# Patient Record
Sex: MALE | Race: WHITE | HISPANIC OR LATINO
[De-identification: names, ages, dates, MRNs, and addresses within clinical notes are randomized per-mention and may not be internally consistent; named-entity substitution may affect disease eponyms.]

---
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
## 2018-02-10 ENCOUNTER — EMERGENCY (EMERGENCY)
Facility: HOSPITAL | Age: 18
LOS: 0 days | Discharge: HOME | End: 2018-02-10
Attending: STUDENT IN AN ORGANIZED HEALTH CARE EDUCATION/TRAINING PROGRAM

## 2018-02-10 VITALS
WEIGHT: 285.5 LBS | OXYGEN SATURATION: 100 % | SYSTOLIC BLOOD PRESSURE: 143 MMHG | RESPIRATION RATE: 20 BRPM | HEART RATE: 84 BPM | DIASTOLIC BLOOD PRESSURE: 83 MMHG | TEMPERATURE: 98 F

## 2018-02-10 DIAGNOSIS — R07.89 OTHER CHEST PAIN: ICD-10-CM

## 2018-02-10 DIAGNOSIS — R05 COUGH: ICD-10-CM

## 2018-02-10 DIAGNOSIS — R06.02 SHORTNESS OF BREATH: ICD-10-CM

## 2018-02-10 DIAGNOSIS — R00.2 PALPITATIONS: ICD-10-CM

## 2018-02-10 DIAGNOSIS — F17.200 NICOTINE DEPENDENCE, UNSPECIFIED, UNCOMPLICATED: ICD-10-CM

## 2018-02-10 DIAGNOSIS — R42 DIZZINESS AND GIDDINESS: ICD-10-CM

## 2018-02-10 RX ORDER — ACETAMINOPHEN 500 MG
975 TABLET ORAL ONCE
Qty: 0 | Refills: 0 | Status: COMPLETED | OUTPATIENT
Start: 2018-02-10 | End: 2018-02-10

## 2018-02-10 RX ORDER — IBUPROFEN 200 MG
600 TABLET ORAL ONCE
Qty: 0 | Refills: 0 | Status: COMPLETED | OUTPATIENT
Start: 2018-02-10 | End: 2018-02-10

## 2018-02-10 RX ADMIN — Medication 975 MILLIGRAM(S): at 09:55

## 2018-02-10 RX ADMIN — Medication 600 MILLIGRAM(S): at 09:55

## 2018-02-10 NOTE — ED PROVIDER NOTE - NS ED ROS FT
Constitutional: See HPI.  Pt eating and drinking normally and having normal urine and BM output.  Eyes: No discharge, erythema, pain, vision changes.  ENMT: No URI symptoms. No neck pain or stiffness.  Cardiac: No hx of known congenital defects. + CP. No SOB  Respiratory: No cough, stridor, or respiratory distress.   GI: No nausea, vomiting, diarrhea or pain  : Normal frequency. No foul smelling urine. No dysuria.   MS: No muscle weakness, myalgia, joint pain, back pain  Neuro: No headache or weakness. No LOC.  Skin: No skin rash.

## 2018-02-10 NOTE — ED PROVIDER NOTE - ATTENDING CONTRIBUTION TO CARE
18 y/o M no sig PMH, FHX, p/w chest pain x 1d.  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  pain is "pain" L sided.  + lightheaded, resolved.  non radiating, no n/v sob, cough, fever, leg swelling, syncope.  No prior similar sx. no recent travel. No hx dvt/ pe/ mi.  NO cardiac wup.  Pt has Lsided cp, somewhat worse w/ movement on exam, o/w unremakable.  IMP: cp, unclear etiology; Pt is very low cardiac risk. P: ekg, cxr, FS, reassess.

## 2018-02-10 NOTE — ED PROVIDER NOTE - OBJECTIVE STATEMENT
16 y/o M no pmh p/w 1 day of palpitations, non radiating mid chest pain, SOB and mild dry cough. Denies fever/chills, recent illness, n/v/d.

## 2018-02-10 NOTE — ED PROVIDER NOTE - PHYSICAL EXAMINATION
AOx4, Non toxic appearing, NAD. Head normocephalic, atraumatic. Skin  warm and dry, no acute rash. Heart RRR s1s2 nl, no rub/murmur. Lungs- No retractions, BS equal, CTAB. Abdomen soft ntnd no r/g. Extremities- moves all normally.

## 2018-05-13 ENCOUNTER — TRANSCRIPTION ENCOUNTER (OUTPATIENT)
Age: 18
End: 2018-05-13

## 2019-08-06 ENCOUNTER — TRANSCRIPTION ENCOUNTER (OUTPATIENT)
Age: 19
End: 2019-08-06

## 2019-08-08 ENCOUNTER — TRANSCRIPTION ENCOUNTER (OUTPATIENT)
Age: 19
End: 2019-08-08

## 2019-08-09 ENCOUNTER — TRANSCRIPTION ENCOUNTER (OUTPATIENT)
Age: 19
End: 2019-08-09

## 2019-08-09 ENCOUNTER — INPATIENT (INPATIENT)
Facility: HOSPITAL | Age: 19
LOS: 2 days | Discharge: HOME | End: 2019-08-12
Attending: PEDIATRICS | Admitting: PEDIATRICS
Payer: COMMERCIAL

## 2019-08-09 VITALS
SYSTOLIC BLOOD PRESSURE: 142 MMHG | OXYGEN SATURATION: 98 % | DIASTOLIC BLOOD PRESSURE: 91 MMHG | HEART RATE: 115 BPM | TEMPERATURE: 209 F | RESPIRATION RATE: 20 BRPM

## 2019-08-09 DIAGNOSIS — L03.90 CELLULITIS, UNSPECIFIED: ICD-10-CM

## 2019-08-09 DIAGNOSIS — Z90.49 ACQUIRED ABSENCE OF OTHER SPECIFIED PARTS OF DIGESTIVE TRACT: Chronic | ICD-10-CM

## 2019-08-09 LAB
HCT VFR BLD CALC: 40.6 % — LOW (ref 42–52)
HGB BLD-MCNC: 12.8 G/DL — LOW (ref 14–18)
MCHC RBC-ENTMCNC: 26 PG — LOW (ref 27–31)
MCHC RBC-ENTMCNC: 31.5 G/DL — LOW (ref 32–37)
MCV RBC AUTO: 82.4 FL — SIGNIFICANT CHANGE UP (ref 80–94)
NRBC # BLD: 0 /100 WBCS — SIGNIFICANT CHANGE UP (ref 0–0)
PLATELET # BLD AUTO: 286 K/UL — SIGNIFICANT CHANGE UP (ref 130–400)
RBC # BLD: 4.93 M/UL — SIGNIFICANT CHANGE UP (ref 4.7–6.1)
RBC # FLD: 13.7 % — SIGNIFICANT CHANGE UP (ref 11.5–14.5)
WBC # BLD: 11.26 K/UL — HIGH (ref 4.8–10.8)
WBC # FLD AUTO: 11.26 K/UL — HIGH (ref 4.8–10.8)

## 2019-08-09 PROCEDURE — 73140 X-RAY EXAM OF FINGER(S): CPT | Mod: 26,LT

## 2019-08-09 PROCEDURE — 99285 EMERGENCY DEPT VISIT HI MDM: CPT

## 2019-08-09 RX ORDER — SODIUM CHLORIDE 9 MG/ML
1000 INJECTION, SOLUTION INTRAVENOUS
Refills: 0 | Status: DISCONTINUED | OUTPATIENT
Start: 2019-08-09 | End: 2019-08-11

## 2019-08-09 RX ORDER — IBUPROFEN 200 MG
400 TABLET ORAL EVERY 6 HOURS
Refills: 0 | Status: DISCONTINUED | OUTPATIENT
Start: 2019-08-09 | End: 2019-08-12

## 2019-08-09 RX ORDER — ACETAMINOPHEN 500 MG
650 TABLET ORAL EVERY 6 HOURS
Refills: 0 | Status: DISCONTINUED | OUTPATIENT
Start: 2019-08-09 | End: 2019-08-12

## 2019-08-09 RX ADMIN — Medication 100 MILLIGRAM(S): at 02:27

## 2019-08-09 RX ADMIN — Medication 400 MILLIGRAM(S): at 07:52

## 2019-08-09 RX ADMIN — Medication 100 MILLIGRAM(S): at 10:24

## 2019-08-09 RX ADMIN — Medication 400 MILLIGRAM(S): at 17:55

## 2019-08-09 RX ADMIN — Medication 100 MILLIGRAM(S): at 18:00

## 2019-08-09 NOTE — DISCHARGE NOTE PROVIDER - HOSPITAL COURSE
Kaden is a 19 yo male with no past medical history who presented to the ED s/p a burn 2 weeks ago at work with worsening erythema of the affected left 2nd digit. 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 He states he was initially getting better, until he work up one day flailing a bit and smacked his  hand into the wall approximately 2 days ago. Since that day his finger has been swollen and it began to turn more red. He tried icing the area with minimal reduction in swelling and states it would return within a couple of hours of removing ice. He presented to  and was given oral antibiotics Augmentin/Bactrim which he took <24hours, and then presented to the ED because he states his finger started to look a bit more red and swollen. He was admitted for IV antibiotics.         Pmhx: none    Pshx: appendectomy     allergies: NKDA    UTD on vaccinations     PMD: Weston     Family history of diabetes         In ED: CBC, Blood cx, Xray, x1 Clindamycin         On floor: Clindamycin q8hrs IV. Kaden is a 18 year old male with no past medical history who presented with a 2 day history of erythema, edema and increased tenderness at the second left digit in the context of a recent work place burn from a hot skillet two weeks ago, admitted for management of cellulitis.         Pmhx: none    Pshx: appendectomy     allergies: NKDA    UTD on vaccinations     PMD: Weston     Family history of diabetes         In ED: CBC, Blood cx, Xray, x1 Clindamycin         On floor: Clindamycin q8hrs IV. Kaden is a 18 year old male with no past medical history who presented with a 2 day history of erythema, edema and increased tenderness at the second left digit in the context of a recent work place burn from a hot skillet two weeks ago, admitted for management of cellulitis.         In ED:         CBC, Blood cx, Xray, x1 Clindamycin         Floor course:        Resp: Remained stable on RA throughout course        FENGI: Tolerated regular diet        ID: Clindamycin q8hrs IV. Remained afebrile throughout stay and had improvement in erythema and edema of the affected digit. Blood culture showed ___. XR showed __.         Labs:        Complete Blood Count (08.09.19 @ 01:39)      Nucleated RBC: 0 /100 WBCs      WBC Count: 11.26 K/uL      RBC Count: 4.93 M/uL      Hemoglobin: 12.8 g/dL      Hematocrit: 40.6 %      Mean Cell Volume: 82.4 fL      Mean Cell Hemoglobin: 26.0 pg      Mean Cell Hemoglobin Conc: 31.5 g/dL      Red Cell Distrib Width: 13.7 %      Platelet Count - Automated: 286 K/uL Kaden is a 18 year old male with no past medical history who presented with a 2 day history of erythema, edema and increased tenderness at the second left digit in the context of a recent work place burn from a hot skillet two weeks ago, admitted for management of cellulitis.         In ED:         CBC, Blood cx, Xray, x1 Clindamycin         Floor course:        Resp: Remained stable on RA throughout course        FENGI: Tolerated regular diet        ID: Clindamycin q8hrs IV. Remained afebrile throughout stay and had mild improvement in erythema and edema of the affected digit. Blood culture showed ___. XR showed __. He was discharged home on __ day course of Clindamycin __mg PO __.        Labs:        Complete Blood Count (08.09.19 @ 01:39)      Nucleated RBC: 0 /100 WBCs      WBC Count: 11.26 K/uL      RBC Count: 4.93 M/uL      Hemoglobin: 12.8 g/dL      Hematocrit: 40.6 %      Mean Cell Volume: 82.4 fL      Mean Cell Hemoglobin: 26.0 pg      Mean Cell Hemoglobin Conc: 31.5 g/dL      Red Cell Distrib Width: 13.7 %      Platelet Count - Automated: 286 K/uL Kaden is a 18 year old male with no past medical history who presented with a 2 day history of erythema, edema and increased tenderness at the second left digit in the context of a recent work place burn from a hot skillet two weeks ago, admitted for management of cellulitis.         In ED:         CBC, Blood cx, Xray, x1 Clindamycin         Floor course:        Resp: Remained stable on RA throughout course        FENGI: Tolerated regular diet        ID: Clindamycin q8hrs IV. Remained afebrile throughout stay and had mild improvement in erythema and edema of the affected digit. Blood culture showed ___. XR negative for osteomyelitis. He was discharged home on __ day course of Clindamycin __mg PO q8h.        Labs:        Complete Blood Count (08.09.19 @ 01:39)      Nucleated RBC: 0 /100 WBCs      WBC Count: 11.26 K/uL      RBC Count: 4.93 M/uL      Hemoglobin: 12.8 g/dL      Hematocrit: 40.6 %      Mean Cell Volume: 82.4 fL      Mean Cell Hemoglobin: 26.0 pg      Mean Cell Hemoglobin Conc: 31.5 g/dL      Red Cell Distrib Width: 13.7 %      Platelet Count - Automated: 286 K/uL        Imaging:        < from: Xray Finger, Left Hand (08.09.19 @ 02:55) >        Impression:    Second digit soft tissue swelling without further evidence of     osteomyelitis.         < end of copied text > Kaden is a 18 year old male with no past medical history who presented with a 2 day history of erythema, edema and increased tenderness at the second left digit in the context of a recent work place burn from a hot skillet two weeks ago, admitted for management of cellulitis.         In ED:         CBC, Blood cx, Xray, x1 Clindamycin         Floor course:        Resp: Remained stable on RA throughout course        FENGI: Tolerated regular diet        ID: Clindamycin q8hrs IV. Remained afebrile throughout stay and had mild improvement in erythema and edema of the affected digit. Blood culture showed no growth to date.     XR negative for osteomyelitis. He was discharged home on __ day course of Clindamycin __mg PO q8h.        Labs:        Complete Blood Count (08.09.19 @ 01:39)      Nucleated RBC: 0 /100 WBCs      WBC Count: 11.26 K/uL      RBC Count: 4.93 M/uL      Hemoglobin: 12.8 g/dL      Hematocrit: 40.6 %      Mean Cell Volume: 82.4 fL      Mean Cell Hemoglobin: 26.0 pg      Mean Cell Hemoglobin Conc: 31.5 g/dL      Red Cell Distrib Width: 13.7 %      Platelet Count - Automated: 286 K/uL        Imaging:        < from: Xray Finger, Left Hand (08.09.19 @ 02:55) >        Impression:    Second digit soft tissue swelling without further evidence of     osteomyelitis.         < end of copied text > Kaden is a 18 year old male with no past medical history who presented with a 2 day history of erythema, edema and increased tenderness at the second left digit in the context of a recent work place burn from a hot skillet two weeks ago, admitted for management of cellulitis.         In ED:         CBC, Blood cx, Xray, x1 Clindamycin         Floor course:        Resp: Remained stable on RA throughout course        FENGI: Tolerated regular diet        ID: Started on Clindamycin, transitioned to vancomycin 1650mg q12h on day 2 after no improvement. Surgery performed I&D which he tolerated well. Remained afebrile throughout stay and had improvement in erythema and edema of the affected digit. Blood culture showed no growth to date. Wound culture positive for staph. aureus, sensitive to ___.     XR negative for osteomyelitis. He was discharged home on __ day course of ___.         Labs:        Culture - Blood (08.09.19 @ 01:39)      Specimen Source: .Blood Blood-Peripheral      Culture Results:     No growth to date.        Culture - Abscess with Gram Stain (08.10.19 @ 13:10)      Specimen Source: .Abscess left finger 2nd digit      Culture Results:     Moderate Staphylococcus aureus        Complete Blood Count (08.09.19 @ 01:39)      Nucleated RBC: 0 /100 WBCs      WBC Count: 11.26 K/uL      RBC Count: 4.93 M/uL      Hemoglobin: 12.8 g/dL      Hematocrit: 40.6 %      Mean Cell Volume: 82.4 fL      Mean Cell Hemoglobin: 26.0 pg      Mean Cell Hemoglobin Conc: 31.5 g/dL      Red Cell Distrib Width: 13.7 %      Platelet Count - Automated: 286 K/uL        Imaging:        < from: Xray Finger, Left Hand (08.09.19 @ 02:55) >        Impression:    Second digit soft tissue swelling without further evidence of     osteomyelitis.         < end of copied text > Kaden is a 18 year old male with no past medical history who presented with a 2 day history of erythema, edema and increased tenderness at the second left digit in the context of a recent work place burn from a hot skillet two weeks ago, admitted for management of cellulitis.         In ED:         CBC, Blood cx, Xray, x1 Clindamycin         Floor course:        Resp: Remained stable on RA throughout course        FENGI: Tolerated regular diet        ID: Started on Clindamycin, transitioned to vancomycin 1650mg q12h on day 2 after no improvement. Surgery performed I&D which he tolerated well. Remained afebrile throughout stay and had improvement in erythema and edema of the affected digit. Blood culture showed no growth to date. Wound culture positive for staph. aureus, sensitive to bactrim, clindamycin and vancomycin. Patient to be sent home on bactrim for 7 more days.    XR negative for osteomyelitis. He was discharged home on 8/12/19. He will follow up with his PMD.         Labs:        Culture - Blood (08.09.19 @ 01:39)      Specimen Source: .Blood Blood-Peripheral      Culture Results:     No growth to date.        Culture - Abscess with Gram Stain (08.10.19 @ 13:10)      Specimen Source: .Abscess left finger 2nd digit      Culture Results:     Moderate Staphylococcus aureus        Complete Blood Count (08.09.19 @ 01:39)      Nucleated RBC: 0 /100 WBCs      WBC Count: 11.26 K/uL      RBC Count: 4.93 M/uL      Hemoglobin: 12.8 g/dL      Hematocrit: 40.6 %      Mean Cell Volume: 82.4 fL      Mean Cell Hemoglobin: 26.0 pg      Mean Cell Hemoglobin Conc: 31.5 g/dL      Red Cell Distrib Width: 13.7 %      Platelet Count - Automated: 286 K/uL        Imaging:        < from: Xray Finger, Left Hand (08.09.19 @ 02:55) >        Impression:    Second digit soft tissue swelling without further evidence of     osteomyelitis.         < end of copied text >

## 2019-08-09 NOTE — H&P PEDIATRIC - PROBLEM SELECTOR PLAN 1
IV Clindamycin q8hrs. Cx if drains.     Resp:  ARASH ELII:  Regular diet  D5NS    Pain/Fever:  Acetaminophen/Ibuprofen

## 2019-08-09 NOTE — ED PROVIDER NOTE - NS ED ROS FT
Review of Systems:  	•	CONSTITUTIONAL - no fever, no diaphoresis  	•	SKIN - +redness and swelling to LT hand; no lesions  	•	HEMATOLOGIC - no bleeding, no bruising  	•	EYES - no discharge, no injection  	•	ENT - no otorrhea, no rhinorrhea  	•	RESPIRATORY - no shortness of breath, no cough  	•	CARDIAC - no chest pain, no palpitations  	•	GI - no nausea, no vomiting, no diarrhea  	•	MUSCULOSKELETAL - no joint paint, no swelling, no redness  	•	NEUROLOGIC - no weakness, no headache, no anesthesia, no paresthesias

## 2019-08-09 NOTE — DISCHARGE NOTE PROVIDER - PROVIDER TOKENS
FREE:[LAST:[Dione],FIRST:[Brandon],PHONE:[phone]],FAX:[phone]   [phone]    -],ADDRESS:[[address] [place] [address]],FOLLOWUP:[1-3 days]]

## 2019-08-09 NOTE — DISCHARGE NOTE PROVIDER - NSDCCPCAREPLAN_GEN_ALL_CORE_FT
PRINCIPAL DISCHARGE DIAGNOSIS  Diagnosis: Cellulitis  Assessment and Plan of Treatment: -blood cx showed   -xray showed  -received clindamycin 900 mg q8h, sent home on PO clindamycin __mg for __ days. PRINCIPAL DISCHARGE DIAGNOSIS  Diagnosis: Cellulitis  Assessment and Plan of Treatment: -blood cx showed no growth  -wound culture showed moderate staph aureus  -xray showed no evidence of osteomyelitis  -I&D done by surgery, recommend daily dressing changes and wound packing  -received clindamycin 900 mg q8h, switched to vancomycin 1650mg q12h and sent home on ___.

## 2019-08-09 NOTE — ED PROVIDER NOTE - PHYSICAL EXAMINATION
GEN: alert, NAD  HEAD:  normocephalic, atraumatic  EYES:  conjunctivae without injection, drainage or discharge  ENMT:  nasal mucosa moist; mouth moist without ulcerations or lesions; throat moist without erythema, exudate, ulcerations or lesions  NECK:  supple, no masses  CARDIAC:  regular rate and rhythm, normal S1 and S2, no murmurs, rubs or gallops  RESP:  respiratory rate and effort appear normal for age; lungs are clear to auscultation bilaterally; no rales or wheezes  ABDOMEN:  soft, nontender, nondistended, no masses, no organomegaly  MUSCULOSKELETAL/NEURO:  normal movement, normal tone; ROM of LT 2nd finger decreased due to pain, sensation intact cap refill <2sec  SKIN:  LT 2nd finger w/ erythema and edema over PIP, with spread to proximal hand and wrist; well-perfused; warm and dry

## 2019-08-09 NOTE — ED PEDIATRIC NURSE NOTE - CHPI ED NUR SYMPTOMS NEG
no purulent drainage/no redness/no vomiting/no bleeding at site/no blood in mucus/no drainage/no rectal pain/no chills/no fever

## 2019-08-09 NOTE — ED PROVIDER NOTE - ATTENDING CONTRIBUTION TO CARE
15yr male burned his second left digit 2 weeks ago and 2 days ago noticed area to be painful swollen and erythematous went to urgent care and ortho today got bactrim and augmentin but area is getting more red and swollen no fever no chills  VS reviewed, stable.  Gen: interactive, well appearing, no acute distress  HEENT: NC/AT,  right TM  non bulging  left tm,  no evidence of mastoiditis,  moist mucus membranes, pupils equal, responsive, reactive to light and accomodation, no conjunctivitis or scleral icterus; no nasal discharge .   OP no exudates no erythema  Neck: FROM, supple, no cervical LAD  Chest: CTA b/l, no crackles/wheezes, good air entry, no tachypnea or retractions  CV: regular rate and rhythm, no murmurs   Abd: soft, nontender, nondistended, no HSM appreciated, +BS  left second digit healing burn with surrounding erythema warmth and swelling 4cm x4cm and erythema is extending to the volar aspect of hand and wrist  plan will start patient on clindmaycin obtain cbc and hand xray

## 2019-08-09 NOTE — H&P PEDIATRIC - NSHPPHYSICALEXAM_GEN_ALL_CORE
General: Well appearing, well developed Male, appropriately interactive, no acute distress    HEENT: NC/AT, Conjunctiva clear and not injected, sclera non-icteric, Nares patent, Mucous membranes moist, Pharynx nonerythematous, neck supple, no cervical lymphadenopathy   Heart: RRR, S1, S2, no rubs, murmurs, or gallops   Lung: CTAB, no wheezing, rhonchi, or crackles, no retractions, no nasal faring   Abdomen: +BS, soft, nontender, nondistended   Neuro: No nystagmus, EOMI, motor grossly intake  Skin: L 2nd digit erythema and edema, Good turgor, no rash, no bruising or prominent lesions

## 2019-08-09 NOTE — CHART NOTE - NSCHARTNOTEFT_GEN_A_CORE
Kaden is a 18 year old male with no past medical history presenting with a 2 day history of erythema, edema and increased tenderness at the second left digit in the context of a recent work place burn from a hot skillet two weeks ago, admitted for management of cellulitis.    As per patient, he burnt his finger two weeks ago while at work. 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 He was following with the burn team who and managing the wound care appropriately. Two days ago, he hit his finger and noted immediate pain. Over the course of the following two days, erythema and pain ensued. He went to urgent care and ortho who recommended PO abx bactrim and augmentin, he took one dose but thought the area looked worse so came to the ED for evaluation.    ROS +: tenderness, erythema, edema  ROS -: No fever, no vomiting, no diarrhea, no decreased PO, no cough, no worsening rash    PMhx; none  PSx: appendectomy at 6  Meds: none  UTD vaccines  NKA  Family hx: diabetes  Social: lives at home with parents and two cats, one bird, works as a cook, sexually active with women, uses protection, + marijuana use  Birth hx: non contributory  Developmentally: Appropriate    Vital Signs (24 Hrs):  T(C): 36.3 (08-09-19 @ 03:50), Max: 98.3 (08-09-19 @ 00:20)  HR: 70 (08-09-19 @ 03:50) (70 - 115)  BP: 127/72 (08-09-19 @ 03:50) (127/72 - 142/91)  RR: 18 (08-09-19 @ 03:50) (18 - 20)  SpO2: 96% (08-09-19 @ 03:50) (96% - 98%)  Daily Height/Length in cm: 178 (09 Aug 2019 03:50)      PHYSICAL EXAM:  Gen: Patient is well appearing, multiple tattoos over arms  HEENT: NCAT, PERRLA, no conjunctivitis or scleral icterus; no rhinorhea or congestion. OP without exudates/erythema.   Neck: FROM, supple, no cervical LAD  Resp: CTABL, no crackles/wheezes, good air entry, no tachypnea or retractions  CV: RRR, normal S1/S2, no murmurs   Abd: Soft, NT/ND, no HSM appreciated, +BS  Extremities: left second digit erythema and edema over the phalynx, skin is shiny and tender on palpation, no pus draining at this time. Area demarcating region noted.    In the ed, cbc, bcx, xray, clindamycin given, patient admitted.                        12.8   11.26 )-----------( 286      ( 09 Aug 2019 01:39 )             40.6   Bcx: pending    Xray:     Assessment:   18 year old male who is admitted for management of cellulitis, location of the site is the finger, so would warrant from IV abx at this time, however patient did not fail outpatient management as he had only taken one dose earlier on the day of admission. He is well appearing with no systemic signs or symptoms, anticipate transitioning to PO in the near future.    Plan    Resp  -RA    ALCIRAI  -D5NS @ 1/2 M 50 cc/h  -Regular diet    ID  -Clindamycin 900 mg IV q8h  -Wet warm compresses q3h   -Culture if site drains  -F/U Bcx  -F/U xray read

## 2019-08-09 NOTE — H&P PEDIATRIC - HISTORY OF PRESENT ILLNESS
Kaden is a 19 yo male with no past medical history who presented to the ED s/p a burn 1 week ago at work with worsening erythema of the affected left 2nd digit. 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 He states he was initially getting better, until he work up one day flailing a bit and smacked his  hand into the wall. Since that day his finger has been swollen and it began to turn more red. He tried icing the area with minimal reduction in swelling and states it would return within a couple of ours of removing ice. He presented to  and was given oral antibiotics Augmentin/Bactrim which he took <24hours, and then presented to the ED because he states his finger started to look a bit more red and swollen. He is being admitted for IV antibiotics.     Pmhx: none  Pshx: appendectomy   allergies: NKDA  UTD on vaccinations   PMD: Weston   Family history of diabetes     In ED: CBC, Blood cx, Xray, x1 Clindamycin Kaden is a 17 yo male with no past medical history who presented to the ED s/p a burn 2 weeks ago at work with worsening erythema of the affected left 2nd digit. 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 He states he was initially getting better, until he work up one day flailing a bit and smacked his  hand into the wall approximately 2 days ago. Since that day his finger has been swollen and it began to turn more red. He tried icing the area with minimal reduction in swelling and states it would return within a couple of ours of removing ice. He presented to  and was given oral antibiotics Augmentin/Bactrim which he took <24hours, and then presented to the ED because he states his finger started to look a bit more red and swollen. He is being admitted for IV antibiotics.     Pmhx: none  Pshx: appendectomy   allergies: NKDA  UTD on vaccinations   PMD: Weston   Family history of diabetes     In ED: CBC, Blood cx, Xray, x1 Clindamycin

## 2019-08-09 NOTE — H&P PEDIATRIC - NSHPLABSRESULTS_GEN_ALL_CORE
Complete Blood Count (08.09.19 @ 01:39)    Nucleated RBC: 0 /100 WBCs    WBC Count: 11.26 K/uL    RBC Count: 4.93 M/uL    Hemoglobin: 12.8 g/dL    Hematocrit: 40.6 %    Mean Cell Volume: 82.4 fL    Mean Cell Hemoglobin: 26.0 pg    Mean Cell Hemoglobin Conc: 31.5 g/dL    Red Cell Distrib Width: 13.7 %    Platelet Count - Automated: 286 K/uL

## 2019-08-09 NOTE — ED PROVIDER NOTE - OBJECTIVE STATEMENT
18M no pmhx UTD vax c/o LT 2nd digit pain x2 days, worsening; states that he burnt same area on a hot skillet 3-4wks ago, was healing fine until 2 days ago he hit finger on a stucco wall; states finger has become more swollen and erthema is spreading proximally, went to urgent care who sent him to ortho, got xray which was negative, came to ED because sx worsening and worrisome. Took 1 round of bactrim + augmentin rx'ed by [facility], ortho rx'ed keflex but he has not started.

## 2019-08-09 NOTE — H&P PEDIATRIC - NSHPSOCIALHISTORY_GEN_ALL_CORE
Lives at home with mom and dad. Sexually active with women, uses protection. Works as a cook. Occasional marijuana use.

## 2019-08-09 NOTE — H&P PEDIATRIC - ASSESSMENT
Kaden is a 19yo male with no past medical history who presented to the ED s/p a burn 1 week ago, <24hours of oral abx, with erythema and edema of the L 2nd digit, admitted for cellulitis of the left 2nd digit and IV antibiotics. On physical exam his Left 2nd digit appears erythematous and edematous with tense overlaying skin. He is able to move his joint, but endorses mild pain. Kaden is a 19yo male with no past medical history who presented to the ED s/p a burn 2 weeks ago, worsening erythema and edema of the L 2nd digit x2 days, <24hours of oral abx, admitted for cellulitis of the left 2nd digit and IV antibiotics. On physical exam his Left 2nd digit appears erythematous and edematous with tense overlaying skin. He is able to move his joint, but endorses mild pain.

## 2019-08-09 NOTE — DISCHARGE NOTE PROVIDER - CARE PROVIDER_API CALL
Brandon Parham  [address] [place] [address]  Phone: [phone]  Fax: (   )    -  Follow Up Time: 1-3 days

## 2019-08-10 PROCEDURE — 99232 SBSQ HOSP IP/OBS MODERATE 35: CPT

## 2019-08-10 RX ORDER — VANCOMYCIN HCL 1 G
1650 VIAL (EA) INTRAVENOUS EVERY 12 HOURS
Refills: 0 | Status: DISCONTINUED | OUTPATIENT
Start: 2019-08-11 | End: 2019-08-12

## 2019-08-10 RX ORDER — VANCOMYCIN HCL 1 G
1650 VIAL (EA) INTRAVENOUS ONCE
Refills: 0 | Status: DISCONTINUED | OUTPATIENT
Start: 2019-08-10 | End: 2019-08-10

## 2019-08-10 RX ORDER — VANCOMYCIN HCL 1 G
1750 VIAL (EA) INTRAVENOUS EVERY 12 HOURS
Refills: 0 | Status: DISCONTINUED | OUTPATIENT
Start: 2019-08-10 | End: 2019-08-10

## 2019-08-10 RX ORDER — VANCOMYCIN HCL 1 G
1750 VIAL (EA) INTRAVENOUS EVERY 8 HOURS
Refills: 0 | Status: DISCONTINUED | OUTPATIENT
Start: 2019-08-10 | End: 2019-08-10

## 2019-08-10 RX ORDER — VANCOMYCIN HCL 1 G
1650 VIAL (EA) INTRAVENOUS ONCE
Refills: 0 | Status: COMPLETED | OUTPATIENT
Start: 2019-08-10 | End: 2019-08-10

## 2019-08-10 RX ORDER — VANCOMYCIN HCL 1 G
VIAL (EA) INTRAVENOUS
Refills: 0 | Status: DISCONTINUED | OUTPATIENT
Start: 2019-08-10 | End: 2019-08-10

## 2019-08-10 RX ADMIN — Medication 250 MILLIGRAM(S): at 19:21

## 2019-08-10 RX ADMIN — Medication 650 MILLIGRAM(S): at 21:15

## 2019-08-10 RX ADMIN — Medication 100 MILLIGRAM(S): at 10:02

## 2019-08-10 RX ADMIN — Medication 400 MILLIGRAM(S): at 16:15

## 2019-08-10 RX ADMIN — SODIUM CHLORIDE 50 MILLILITER(S): 9 INJECTION, SOLUTION INTRAVENOUS at 12:33

## 2019-08-10 RX ADMIN — Medication 400 MILLIGRAM(S): at 23:38

## 2019-08-10 RX ADMIN — Medication 650 MILLIGRAM(S): at 20:18

## 2019-08-10 RX ADMIN — Medication 400 MILLIGRAM(S): at 16:45

## 2019-08-10 RX ADMIN — Medication 100 MILLIGRAM(S): at 02:11

## 2019-08-10 RX ADMIN — Medication 400 MILLIGRAM(S): at 08:59
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
## 2019-08-10 NOTE — CONSULT NOTE ADULT - ASSESSMENT
Left 2nd digit abscess  - I&D at bedside (see procedure note)  - follow up wound culture  - Continue IV antibiotics  - Change wound packing in morning  - keep hand elevated
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
## 2019-08-10 NOTE — PROGRESS NOTE PEDS - SUBJECTIVE AND OBJECTIVE BOX
Internal/Overnight Events: Patient is a 18y old  Male who presents with a chief complaint of Cellulitis (09 Aug 2019 04:30)  Pt states finger not getting better, had some drainage this morning (which was cultured).  remains afebrile, good appetite, otherwise ok,        MEDICATIONS  (STANDING):  dextrose 5% + sodium chloride 0.9%. - Pediatric 1000 milliLiter(s) (50 mL/Hr) IV Continuous <Continuous>    MEDICATIONS  (PRN):  acetaminophen   Oral Tab/Cap - Peds. 650 milliGRAM(s) Oral every 6 hours PRN Temp greater or equal to 38 C (100.4 F), Mild Pain (1 - 3)  ibuprofen  Oral Tab/Cap - Peds. 400 milliGRAM(s) Oral every 6 hours PRN Temp greater or equal to 38.5C (101.3 F), Moderate Pain (4 - 6)    Allergies    No Known Allergies    Intolerances      Diet:    There are no updates to the medical, surgical, social or family history unless described:    Review of Systems: Negative except for noted above     Vital Signs Last 24 Hrs  T(C): 35.8 (10 Aug 2019 07:15), Max: 36.9 (09 Aug 2019 23:15)  T(F): 96.4 (10 Aug 2019 07:15), Max: 98.4 (09 Aug 2019 23:15)  HR: 68 (10 Aug 2019 07:15) (62 - 76)  BP: 119/78 (10 Aug 2019 07:15) (111/71 - 140/80)  BP(mean): --  RR: 20 (10 Aug 2019 07:15) (18 - 20)  SpO2: 100% (10 Aug 2019 07:15) (97% - 100%)  I&O's Summary    09 Aug 2019 07:01  -  10 Aug 2019 07:00  --------------------------------------------------------  IN: 1160 mL / OUT: 0 mL / NET: 1160 mL    10 Aug 2019 07:01  -  10 Aug 2019 11:16  --------------------------------------------------------  IN: 150 mL / OUT: 0 mL / NET: 150 mL      Pain Score:   Daily Weight Gm: 724818 (09 Aug 2019 03:50)  BMI (kg/m2): 34.4 (08-09 @ 03:50)    Physical Exam:   General: Well developed; well nourished; in no acute distress; alert and sitting up in bed    HEENT: Normocephalic; atraumatic;     conjunctiva clear;       No nasal discharge;      Moist mucous membranes;       Neck supple and non tender; no palpable lymph nodes;   Cardiovascular: Regular rate and rhythm; S1 and S2 Normal; No murmurs, rubs or gallops   Respiratory: Normal respiratory pattern; breath sounds clear to auscultation bilaterally; no signs of increased work of breathing; no wheezing; no retractions; no tachypnea   Abdominal: Soft; non-tender; not distended; normal bowel sounds; no mass or hepatosplenomegaly palpable;   Extremities: L second digit, +erythema, swelling, TTP, c fluctuance, limited ROM flexion PIP d/t swelling   warm and well perfused; peripheral pulses intact; no cyanosis; no edema; capillary refill less than 2 seconds  Skin: Good turgor; no acute rash  Psychiatric: Cooperative and appropriate      Interval Lab Results:                        12.8   11.26 )-----------( 286      ( 09 Aug 2019 01:39 )             40.6         RECENT CULTURES:  08-09 .Blood Blood-Peripheral XXXX XXXX   No growth to date.      Culture - Blood (collected 09 Aug 2019 01:39)  Source: .Blood Blood-Peripheral  Preliminary Report (10 Aug 2019 06:01):    No growth to date.      Interval Imaging Studies:    < from: Xray Finger, Left Hand (08.09.19 @ 02:55) >    EXAM:  XR FINGER(S) MIN 2 VIEWS LT          PROCEDURE DATE:  08/09/2019    INTERPRETATION:  Clinical History/Reason For Exam: cellultis  Technique: XR FINGERS 2 VIEWS LEFT  Comparison: None.  Findings:  Second digit swelling is seen.  No acute osseous abnormality.  No bony resorptive changes or rarefaction.  No radiopaque foreign body.    Impression:  Second digit soft tissue swelling without further evidence of   osteomyelitis.         A/P  This is a 18y Male admitted c  L Second digit cellulitis, not improving on current antibiotic regimen, VSS,   dc clindamycin and start vancomycin  f/up wound cx (sent out this AM)  sx consult (to I&D or not to in sensitive area PIP/finger)

## 2019-08-11 PROCEDURE — 99232 SBSQ HOSP IP/OBS MODERATE 35: CPT

## 2019-08-11 RX ORDER — SODIUM CHLORIDE 9 MG/ML
3 INJECTION INTRAMUSCULAR; INTRAVENOUS; SUBCUTANEOUS EVERY 8 HOURS
Refills: 0 | Status: DISCONTINUED | OUTPATIENT
Start: 2019-08-11 | End: 2019-08-12

## 2019-08-11 RX ADMIN — SODIUM CHLORIDE 3 MILLILITER(S): 9 INJECTION INTRAMUSCULAR; INTRAVENOUS; SUBCUTANEOUS at 23:37

## 2019-08-11 RX ADMIN — Medication 400 MILLIGRAM(S): at 08:00

## 2019-08-11 RX ADMIN — Medication 250 MILLIGRAM(S): at 08:00

## 2019-08-11 RX ADMIN — Medication 650 MILLIGRAM(S): at 19:49

## 2019-08-11 RX ADMIN — Medication 400 MILLIGRAM(S): at 15:00

## 2019-08-11 RX ADMIN — Medication 250 MILLIGRAM(S): at 19:40
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
## 2019-08-11 NOTE — PROGRESS NOTE PEDS - SUBJECTIVE AND OBJECTIVE BOX
Internal/Overnight Events: Patient is a 18y old  Male who presents with a chief complaint of Cellulitis (10 Aug 2019 19:28)  No significant events overnight and this morning. Pt s/p I &D yesterday. just had dressing changed by sx this morning. Pt states still has pain, remains afebrile. appetite ok.    History per: Pt and mom        MEDICATIONS  (STANDING):  dextrose 5% + sodium chloride 0.9%. - Pediatric 1000 milliLiter(s) (50 mL/Hr) IV Continuous <Continuous>  vancomycin  IVPB 1650 milliGRAM(s) IV Intermittent every 12 hours    MEDICATIONS  (PRN):  acetaminophen   Oral Tab/Cap - Peds. 650 milliGRAM(s) Oral every 6 hours PRN Temp greater or equal to 38 C (100.4 F), Mild Pain (1 - 3)  ibuprofen  Oral Tab/Cap - Peds. 400 milliGRAM(s) Oral every 6 hours PRN Temp greater or equal to 38.5C (101.3 F), Moderate Pain (4 - 6)    Allergies    No Known Allergies    Intolerances      Diet:    There are no updates to the medical, surgical, social or family history unless described:    Review of Systems: Negative except for noted above     Vital Signs Last 24 Hrs  T(C): 36.2 (11 Aug 2019 07:30), Max: 36.3 (10 Aug 2019 15:39)  T(F): 97.1 (11 Aug 2019 07:30), Max: 97.3 (10 Aug 2019 15:39)  HR: 56 (11 Aug 2019 07:30) (56 - 71)  BP: 109/74 (11 Aug 2019 07:30) (109/74 - 133/71)  BP(mean): --  RR: 20 (11 Aug 2019 07:30) (20 - 20)  SpO2: 100% (11 Aug 2019 07:30) (99% - 100%)  I&O's Summary    10 Aug 2019 07:01  -  11 Aug 2019 07:00  --------------------------------------------------------  IN: 1575 mL / OUT: 0 mL / NET: 1575 mL      Pain Score:   Daily Weight Gm: 991964 (09 Aug 2019 03:50)  BMI (kg/m2): 34.4 (08-09 @ 03:50)    Physical Exam:   General: Well developed; well nourished; in no acute distress; alert and sitting up in bed    HEENT: Normocephalic; atraumatic;        sclera clear,  No nasal discharge;     Moist mucous membranes;        Neck supple and non tender; no palpable lymph nodes  Cardiovascular: Regular rate and rhythm; S1 and S2 Normal; No murmurs, rubs or gallops   Respiratory: Normal respiratory pattern; breath sounds clear to auscultation bilaterally; no signs of increased work of breathing; no wheezing; no retractions; no tachypnea   Abdominal: Soft; non-tender; not distended; normal bowel sounds; no mass or hepatosplenomegaly palpable;  Extremities: L hand second digit with dressing in place,   Skin: Good turgor; no acute rash  Psychiatric: Cooperative and appropriate      Interval Lab Results:                        12.8   11.26 )-----------( 286      ( 09 Aug 2019 01:39 )             40.6         RECENT CULTURES:  08-09 .Blood Blood-Peripheral XXXX XXXX   No growth to date.          Culture - Blood (collected 09 Aug 2019 01:39)  Source: .Blood Blood-Peripheral  Preliminary Report (10 Aug 2019 06:01):    No growth to date.            A/P  This is a 18y Male admitted with Left hand second digit cellulitis c abscess s/p I &D (8/10) remains afebrile, on vancomycin day 2 (changed from clindamycin yesterday)  f/up wound cx  f/up sx  continue vanco

## 2019-08-11 NOTE — PROGRESS NOTE ADULT - SUBJECTIVE AND OBJECTIVE BOX
Progress Note: Surgery  Patient: CHRISTIAN REZA , 18y (2000)Male   MRN: [id number]  Location: [address]  Visit: 08-09-19 Inpatient  Date: 08-11-19 @ 04:48    Procedure/Diagnosis: s/p L&D left 2nd digit  Events over 24h: No acute event overnight. No new complaint.     Vitals: T(F): 96.6 (08-10-19 @ 23:49), Max: 97.3 (08-10-19 @ 15:39)  HR: 64 (08-10-19 @ 23:49)  BP: 121/56 (08-10-19 @ 23:49) (116/64 - 121/56)  RR: 20 (08-10-19 @ 23:49)  SpO2: 100% (08-10-19 @ 23:49)      Diet:   IV Fluid: dextrose 5% + sodium chloride 0.9%. - Pediatric 1000 milliLiter(s) (50 mL/Hr) IV Continuous <Continuous>      In:   08-09-19 @ 07:01  -  08-10-19 @ 07:00  --------------------------------------------------------  IN: 1160 mL    08-10-19 @ 07:01  -  08-11-19 @ 04:48  --------------------------------------------------------  IN: 1375 mL      Out:   08-09-19 @ 07:01  -  08-10-19 @ 07:00  --------------------------------------------------------  OUT:  Total OUT: 0 mL      08-10-19 @ 07:01  -  08-11-19 @ 04:48  --------------------------------------------------------  OUT:  Total OUT: 0 mL        Net:   08-09-19 @ 07:01  -  08-10-19 @ 07:00  --------------------------------------------------------  NET: 1160 mL    08-10-19 @ 07:01  -  08-11-19 @ 04:48  --------------------------------------------------------  NET: 1375 mL        Physical Examination:  Constitutional: No acute distress, alert  Respiratory: Clear lung sounds bilateral, no wheeze, crackle or rhonchi  Cardiovascular: S1, S2, no murmur, RRR  Extremities: Erythema and edema of left index finger, near joint. Draining serous fluid. Finger held in a semi-flexed position.    Medications: [Standing]  dextrose 5% + sodium chloride 0.9%. - Pediatric 1000 milliLiter(s) (50 mL/Hr) IV Continuous <Continuous>  vancomycin  IVPB 1650 milliGRAM(s) IV Intermittent every 12 hours    Medications:[PRN]  acetaminophen   Oral Tab/Cap - Peds. 650 milliGRAM(s) Oral every 6 hours PRN  ibuprofen  Oral Tab/Cap - Peds. 400 milliGRAM(s) Oral every 6 hours PRN    Labs:          Micro/Urine:    Imaging:  None/24h

## 2019-08-11 NOTE — PROGRESS NOTE ADULT - ASSESSMENT
Assessment:  Patient is a 18 year old male admitted for cellulitis s/p I&D     Plan  -trend labs  -monitor dressing    Date/Time: 08-11-19 @ 04:49

## 2019-08-12 ENCOUNTER — TRANSCRIPTION ENCOUNTER (OUTPATIENT)
Age: 19
End: 2019-08-12

## 2019-08-12 VITALS
TEMPERATURE: 98 F | OXYGEN SATURATION: 99 % | DIASTOLIC BLOOD PRESSURE: 67 MMHG | HEART RATE: 94 BPM | RESPIRATION RATE: 20 BRPM | SYSTOLIC BLOOD PRESSURE: 131 MMHG

## 2019-08-12 LAB
-  AMPICILLIN/SULBACTAM: SIGNIFICANT CHANGE UP
-  AMPICILLIN/SULBACTAM: SIGNIFICANT CHANGE UP
-  CEFAZOLIN: SIGNIFICANT CHANGE UP
-  CEFAZOLIN: SIGNIFICANT CHANGE UP
-  CLINDAMYCIN: SIGNIFICANT CHANGE UP
-  CLINDAMYCIN: SIGNIFICANT CHANGE UP
-  DAPTOMYCIN: SIGNIFICANT CHANGE UP
-  DAPTOMYCIN: SIGNIFICANT CHANGE UP
-  ERYTHROMYCIN: SIGNIFICANT CHANGE UP
-  ERYTHROMYCIN: SIGNIFICANT CHANGE UP
-  GENTAMICIN: SIGNIFICANT CHANGE UP
-  GENTAMICIN: SIGNIFICANT CHANGE UP
-  LINEZOLID: SIGNIFICANT CHANGE UP
-  LINEZOLID: SIGNIFICANT CHANGE UP
-  OXACILLIN: SIGNIFICANT CHANGE UP
-  OXACILLIN: SIGNIFICANT CHANGE UP
-  PENICILLIN: SIGNIFICANT CHANGE UP
-  PENICILLIN: SIGNIFICANT CHANGE UP
-  RIFAMPIN: SIGNIFICANT CHANGE UP
-  RIFAMPIN: SIGNIFICANT CHANGE UP
-  TETRACYCLINE: SIGNIFICANT CHANGE UP
-  TETRACYCLINE: SIGNIFICANT CHANGE UP
-  TRIMETHOPRIM/SULFAMETHOXAZOLE: SIGNIFICANT CHANGE UP
-  TRIMETHOPRIM/SULFAMETHOXAZOLE: SIGNIFICANT CHANGE UP
-  VANCOMYCIN: SIGNIFICANT CHANGE UP
-  VANCOMYCIN: SIGNIFICANT CHANGE UP
CULTURE RESULTS: SIGNIFICANT CHANGE UP
CULTURE RESULTS: SIGNIFICANT CHANGE UP
METHOD TYPE: SIGNIFICANT CHANGE UP
METHOD TYPE: SIGNIFICANT CHANGE UP
ORGANISM # SPEC MICROSCOPIC CNT: SIGNIFICANT CHANGE UP
SPECIMEN SOURCE: SIGNIFICANT CHANGE UP
SPECIMEN SOURCE: SIGNIFICANT CHANGE UP
VANCOMYCIN TROUGH SERPL-MCNC: 9.7 UG/ML — SIGNIFICANT CHANGE UP (ref 5–10)

## 2019-08-12 PROCEDURE — 99232 SBSQ HOSP IP/OBS MODERATE 35: CPT

## 2019-08-12 RX ORDER — IBUPROFEN 200 MG
1 TABLET ORAL
Qty: 0 | Refills: 0 | DISCHARGE
Start: 2019-08-12

## 2019-08-12 RX ORDER — ACETAMINOPHEN 500 MG
2 TABLET ORAL
Qty: 0 | Refills: 0 | DISCHARGE
Start: 2019-08-12

## 2019-08-12 RX ORDER — VANCOMYCIN HCL 1 G
1950 VIAL (EA) INTRAVENOUS EVERY 12 HOURS
Refills: 0 | Status: DISCONTINUED | OUTPATIENT
Start: 2019-08-12 | End: 2019-08-12

## 2019-08-12 RX ADMIN — Medication 250 MILLIGRAM(S): at 07:51

## 2019-08-12 RX ADMIN — Medication 400 MILLIGRAM(S): at 13:00

## 2019-08-12 RX ADMIN — SODIUM CHLORIDE 3 MILLILITER(S): 9 INJECTION INTRAMUSCULAR; INTRAVENOUS; SUBCUTANEOUS at 06:07

## 2019-08-12 RX ADMIN — Medication 400 MILLIGRAM(S): at 12:21

## 2019-08-12 RX ADMIN — SODIUM CHLORIDE 3 MILLILITER(S): 9 INJECTION INTRAMUSCULAR; INTRAVENOUS; SUBCUTANEOUS at 14:08

## 2019-08-12 NOTE — DISCHARGE NOTE NURSING/CASE MANAGEMENT/SOCIAL WORK - NSDCDPATPORTLINK_GEN_ALL_CORE
You can access the [facility] Patient Portal, offered by [facility], by registering with the following website: [email or web address]
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
## 2019-08-12 NOTE — PROGRESS NOTE ADULT - SUBJECTIVE AND OBJECTIVE BOX
GENERAL SURGERY PROGRESS NOTE     CHRISTIAN REZA  18y  Male  Hospital day :3d   OVERNIGHT EVENTS: No acute events overnight.    T(F): 96.8 (08-11-19 @ 23:43), Max: 97.1 (08-11-19 @ 07:30)  HR: 67 (08-11-19 @ 23:43) (56 - 67)  BP: 105/51 (08-11-19 @ 23:43) (105/51 - 133/71)  RR: 18 (08-11-19 @ 23:43) (18 - 20)  SpO2: 99% (08-11-19 @ 23:43) (99% - 100%)    DIET/FLUIDS: sodium chloride 0.9% lock flush 3 milliLiter(s) IV Push every 8 hours    ABx: vancomycin  IVPB 1650 milliGRAM(s) IV Intermittent every 12 hours      PHYSICAL EXAM:  GENERAL: NAD, well-appearing  CHEST/LUNG: Clear to auscultation bilaterally  HEART: Regular rate and rhythm  ABDOMEN: Soft, Nontender, Nondistended;   EXTREMITIES:  No clubbing, cyanosis, or edema      LABS  Labs:  CAPILLARY BLOOD GLUCOSE    Culture - Abscess with Gram Stain (collected 10 Aug 2019 13:10)  Source: .Abscess left finger 2nd digit  Preliminary Report (11 Aug 2019 21:07):    Moderate Staphylococcus aureus    Culture - Abscess with Gram Stain (collected 10 Aug 2019 09:20)  Source: .Abscess Arm - Left  Preliminary Report (11 Aug 2019 21:16):    Few Staphylococcus aureus

## 2019-08-12 NOTE — PROGRESS NOTE PEDS - ATTENDING COMMENTS
Pt seen and examined, discussed and agree with resident A/P. 18y Male admitted with Left hand second digit cellulitis c abscess s/p I &D (8/10) remains afebrile, (prelim cx result growing staph aureus)on vancomycin day 3.  f/up wound cx sensitivities and then can dc home on appropriate PO antibiotic  f/up sx (sx to continue wound packing/dressing changes)

## 2019-08-12 NOTE — PROGRESS NOTE ADULT - ASSESSMENT
Assessment:  Patient is a 18 year old male admitted for cellulitis s/p I&D. Packing and dressing change was done.    Plan  - packing and dressing change daily  - monitor dressing  - continue abx
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
## 2019-08-14 LAB
CULTURE RESULTS: SIGNIFICANT CHANGE UP
SPECIMEN SOURCE: SIGNIFICANT CHANGE UP

## 2019-08-15 DIAGNOSIS — L03.012 CELLULITIS OF LEFT FINGER: ICD-10-CM

## 2019-08-15 DIAGNOSIS — L02.512 CUTANEOUS ABSCESS OF LEFT HAND: ICD-10-CM

## 2019-08-15 DIAGNOSIS — B95.61 METHICILLIN SUSCEPTIBLE STAPHYLOCOCCUS AUREUS INFECTION AS THE CAUSE OF DISEASES CLASSIFIED ELSEWHERE: ICD-10-CM

## 2019-08-15 DIAGNOSIS — F12.90 CANNABIS USE, UNSPECIFIED, UNCOMPLICATED: ICD-10-CM

## 2019-11-05 ENCOUNTER — EMERGENCY (EMERGENCY)
Facility: HOSPITAL | Age: 19
LOS: 0 days | Discharge: HOME | End: 2019-11-05
Attending: STUDENT IN AN ORGANIZED HEALTH CARE EDUCATION/TRAINING PROGRAM | Admitting: STUDENT IN AN ORGANIZED HEALTH CARE EDUCATION/TRAINING PROGRAM
Payer: OTHER MISCELLANEOUS

## 2019-11-05 VITALS
RESPIRATION RATE: 18 BRPM | OXYGEN SATURATION: 100 % | WEIGHT: 216.93 LBS | SYSTOLIC BLOOD PRESSURE: 137 MMHG | HEART RATE: 63 BPM | DIASTOLIC BLOOD PRESSURE: 81 MMHG | TEMPERATURE: 99 F

## 2019-11-05 DIAGNOSIS — S61.211A LACERATION WITHOUT FOREIGN BODY OF LEFT INDEX FINGER WITHOUT DAMAGE TO NAIL, INITIAL ENCOUNTER: ICD-10-CM

## 2019-11-05 DIAGNOSIS — F17.200 NICOTINE DEPENDENCE, UNSPECIFIED, UNCOMPLICATED: ICD-10-CM
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
## 2019-11-05 DIAGNOSIS — Z90.49 ACQUIRED ABSENCE OF OTHER SPECIFIED PARTS OF DIGESTIVE TRACT: Chronic | ICD-10-CM
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
## 2019-11-05 PROCEDURE — 99282 EMERGENCY DEPT VISIT SF MDM: CPT | Mod: 25

## 2019-11-05 PROCEDURE — 12001 RPR S/N/AX/GEN/TRNK 2.5CM/<: CPT

## 2019-11-05 NOTE — ED PROVIDER NOTE - CLINICAL SUMMARY MEDICAL DECISION MAKING FREE TEXT BOX
2nd finger superficial lac, no evidence of infection, injury to bone or ligament. wound repaired and dressed. discussed return precautions, care precautions and suture removal timing

## 2019-11-05 NOTE — ED PROVIDER NOTE - NSFOLLOWUPINSTRUCTIONS_ED_ALL_ED_FT
PLEASE RETURN TO ED OR YOUR PRIMARY MD FOR SUTURE REMOVAL IN 7 TO 10 DAYS.     Laceration    A laceration is a cut that goes through all of the layers of the skin and into the tissue that is right under the skin. Some lacerations heal on their own. Others need to be closed with skin adhesive strips, skin glue, stitches (sutures), or staples. Proper laceration care minimizes the risk of infection and helps the laceration to heal better.  If non-absorbable stitches or staples have been placed, they must be taken out within the time frame instructed by your healthcare provider.    SEEK IMMEDIATE MEDICAL CARE IF YOU HAVE ANY OF THE FOLLOWING SYMPTOMS: swelling around the wound, worsening pain, drainage from the wound, red streaking going away from your wound, inability to move finger or toe near the laceration, or discoloration of skin near the laceration.

## 2019-11-05 NOTE — ED PROVIDER NOTE - PATIENT PORTAL LINK FT
You can access the FollowMyHealth Patient Portal offered by [facility] by registering at the following website: [email or web address]. By joining [employer]’s FollowMyHealth portal, you will also be able to view your health information using other applications (apps) compatible with our system.

## 2019-11-05 NOTE — ED PROVIDER NOTE - NSFOLLOWUPCLINICS_GEN_ALL_ED_FT
[facility]  Pediatric  .  [place]   Phone: [phone]  Fax:   Follow Up Time: 1-3 Days

## 2019-11-05 NOTE — ED PROVIDER NOTE - PHYSICAL EXAMINATION
CONSTITUTIONAL: Well-developed; well-nourished; in no acute distress.   SKIN: warm, dry. 1.5cm laceration to L. 2nd digit tip.  CARD: 2+ radial pulses B/L. Good cap refill.   RESP: No wheezes, rales or rhonchi.  EXT: Normal ROM.  No clubbing, cyanosis or edema.   LYMPH: No acute cervical adenopathy.  NEURO: Alert, oriented, grossly unremarkable  PSYCH: Cooperative, appropriate.

## 2019-11-05 NOTE — ED PROVIDER NOTE - OBJECTIVE STATEMENT
18y M w/ no sig PMH presents with laceration to L. 2nd digit. Suffered laceration while slicing a bag of salt. Superficial laceration ot L. 2nd digit tip. No other injuries. Denies fever, chills, or numbness/tingling. UTD on immunizations.

## 2019-11-05 NOTE — ED PROVIDER NOTE - NS ED ROS FT
Eyes:  No visual changes, eye pain or discharge.  ENMT:  No hearing changes, pain, no sore throat or runny nose, no difficulty swallowing  Cardiac:  No chest pain, SOB or edema. No chest pain with exertion.  Respiratory:  No cough or respiratory distress. No hemoptysis. No history of asthma or RAD.  GI:  No nausea, vomiting, diarrhea or abdominal pain.  :  No dysuria, frequency or burning.  MS:  No myalgia, muscle weakness, joint pain or back pain.  Neuro:  No headache or weakness.  No LOC.  Skin:  No skin rash. + laceration  Endocrine: No history of thyroid disease or diabetes.

## 2019-11-05 NOTE — ED PROVIDER NOTE - ATTENDING CONTRIBUTION TO CARE
19 yo m no pmh vax utd here for lac to L 2nd digit. accidental, was trying to cut bag of salt open.     vss  gen- NAD, aaox3  card-rrr  lungs-ctab, no wheezing or rhonchi  abd-sntnd, no guarding or rebound  neuro- full str/sensation, cn ii-xii grossly intact, normal coordination and gait  L hand- L 2nd digit w/ superficial lac, FROM to DIP/DIP/MCP, sensation intact, normal cap refil    will irrigate, anesthetize and repair

## 2019-11-06 NOTE — ED ADULT NURSE NOTE - OBJECTIVE STATEMENT
pt is a 18y M pw laceration to Left hand 2nd digit, sts Suffered laceration while slicing a bag of salt. denies other injuries. Denies fever, chills, or numbness/tingling. UTD on immunizations.

## 2021-01-15 ENCOUNTER — EMERGENCY (EMERGENCY)
Facility: HOSPITAL | Age: 21
LOS: 0 days | Discharge: HOME | End: 2021-01-15
Attending: EMERGENCY MEDICINE | Admitting: EMERGENCY MEDICINE
Payer: COMMERCIAL

## 2021-01-15 VITALS
HEART RATE: 83 BPM | RESPIRATION RATE: 16 BRPM | OXYGEN SATURATION: 99 % | TEMPERATURE: 98 F | SYSTOLIC BLOOD PRESSURE: 164 MMHG | DIASTOLIC BLOOD PRESSURE: 85 MMHG | WEIGHT: 235.01 LBS

## 2021-01-15 DIAGNOSIS — S61.012A LACERATION WITHOUT FOREIGN BODY OF LEFT THUMB WITHOUT DAMAGE TO NAIL, INITIAL ENCOUNTER: ICD-10-CM
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
## 2021-01-15 DIAGNOSIS — Z90.49 ACQUIRED ABSENCE OF OTHER SPECIFIED PARTS OF DIGESTIVE TRACT: Chronic | ICD-10-CM

## 2021-01-15 PROCEDURE — 99283 EMERGENCY DEPT VISIT LOW MDM: CPT | Mod: 25

## 2021-01-15 PROCEDURE — 73130 X-RAY EXAM OF HAND: CPT | Mod: 26,LT

## 2021-01-15 PROCEDURE — 12001 RPR S/N/AX/GEN/TRNK 2.5CM/<: CPT

## 2021-01-15 RX ORDER — TETANUS TOXOID, REDUCED DIPHTHERIA TOXOID AND ACELLULAR PERTUSSIS VACCINE, ADSORBED 5; 2.5; 8; 8; 2.5 [IU]/.5ML; [IU]/.5ML; UG/.5ML; UG/.5ML; UG/.5ML
0.5 SUSPENSION INTRAMUSCULAR ONCE
Refills: 0 | Status: COMPLETED | OUTPATIENT
Start: 2021-01-15 | End: 2021-01-15

## 2021-01-15 RX ADMIN — TETANUS TOXOID, REDUCED DIPHTHERIA TOXOID AND ACELLULAR PERTUSSIS VACCINE, ADSORBED 0.5 MILLILITER(S): 5; 2.5; 8; 8; 2.5 SUSPENSION INTRAMUSCULAR at 18:00

## 2021-01-15 NOTE — ED PROVIDER NOTE - NS ED ROS FT
Constitutional: no fever, chills, no recent weight loss, change in appetite or malaise  Cardiac: No chest pain, SOB or edema.  Respiratory: No cough or respiratory distress  GI: No nausea, vomiting, diarrhea or abdominal pain.  MS: no pain to back or extremities, no loss of ROM, no weakness  Neuro: No headache or weakness. No LOC.  Skin: + avulsion to tip of L thumb  Endocrine: No history of thyroid disease or diabetes.  Except as documented in the HPI, all other systems are negative.

## 2021-01-15 NOTE — ED PROVIDER NOTE - ATTENDING CONTRIBUTION TO CARE
19 yo M no pmh presents with laceration to the left thumb. less than 1 hour prior to arrival was cutting steak with a new knife and he sliced the tip of his left thumb off. States that the wound is still bleeding. no numbness, tingling or weakness. Unknown when his last tdap was.     CONSTITUTIONAL: Well-developed; well-nourished; in no acute distress.   SKIN: warm, dry. avulsion to the left 1st digit through distal tip including part of nail. no bone exposure. neurovascularly intact.   EXT: Normal ROM.  5/5 strength, no bone or tendon involvement.   LYMPH: No acute cervical adenopathy.  NEURO: Alert, oriented, grossly unremarkable. neurovascularly intact. 2+ pulses.

## 2021-01-15 NOTE — ED PROVIDER NOTE - CLINICAL SUMMARY MEDICAL DECISION MAKING FREE TEXT BOX
Patient presents with skin avulsion. xray done, no bone involvement. wound cleaned, no bone exposure. Bleeding controlled. Discharged with pmd follow up and return precautions.

## 2021-01-15 NOTE — ED PROVIDER NOTE - PATIENT PORTAL LINK FT
You can access the FollowMyHealth Patient Portal offered by [facility] by registering at the following website: [email or web address]. By joining [employer]’s FollowMyHealth portal, you will also be able to view your health information using other applications (apps) compatible with our system.

## 2021-01-15 NOTE — ED PROVIDER NOTE - NSFOLLOWUPINSTRUCTIONS_ED_ALL_ED_FT
Skin Avulsion    WHAT YOU NEED TO KNOW:    Skin avulsion is a wound that happens when skin is torn from your body during an accident or other injury. The torn skin may be lost or too damaged to be repaired, and it must be removed. A wound of this type cannot be stitched closed because there is tissue missing. Avulsion wounds are usually bigger and have more scars because of the missing tissue.    DISCHARGE INSTRUCTIONS:    Medicines:     Antibiotic ointment: Your healthcare provider may tell you to gently rub a topical antibiotic ointment on your wound. This will help prevent an infection and help your wound heal faster.       Pain medicine: You may be given medicine to take away or decrease pain. Do not wait until the pain is severe before you take your medicine.      NSAIDs, such as ibuprofen, help decrease swelling, pain, and fever. This medicine is available with or without a doctor's order. NSAIDs can cause stomach bleeding or kidney problems in certain people. If you take blood thinner medicine, always ask if NSAIDs are safe for you. Always read the medicine label and follow directions. Do not give these medicines to children under 6 months of age without direction from your child's healthcare provider.      Take your medicine as directed. Contact your healthcare provider if you think your medicine is not helping or if you have side effects. Tell him of her if you are allergic to any medicine. Keep a list of the medicines, vitamins, and herbs you take. Include the amounts, and when and why you take them. Bring the list or the pill bottles to follow-up visits. Carry your medicine list with you in case of an emergency.    Care for your wound: Avulsion wounds may take longer to heal because they cannot be closed with tape or stitches. Keep your wound clean and protected to prevent infection and speed healing.     Clean your wound: Wash your hands with soap and water before and after you care for your wound. You may be able to use a soft cloth to gently clean the wound after the first 24 to 48 hours. After that, gently clean the wound once or twice a day with cool water. Do not soak your wound. Use soap to clean around the wound, but try not to get any on the wound itself. Do not use alcohol or hydrogen peroxide to clean your wound unless you are directed to. Gently pat the area dry and reapply the bandage as directed.       Elevate your wound: Prop your injured area on pillows to raise it above the level of your heart. This will help reduce pain and swelling. Do this for 30 minutes at a time, as often as you can.       Bandage your wound: Bandages keep your wound clean, dry, and protected from infection. They may also prevent swelling. Use a bandage that does not stick to your wound, and has a spongy layer to absorb fluids. Leave your bandage on as long as directed. Ask your healthcare provider when and how to change your bandage. Do not wrap the bandage too tightly. This could cut off blood flow and cause more injury.       Use cool compresses: Wet a washcloth or towel with cool water and hold it on your wound as directed. Ask how often to apply the compress and for how long each time.      Reduce scarring: Avoid direct sunlight on your wound. Sunlight may burn or change the color of the new skin over your wound. Use sunscreen (SPF 30 or higher) on the new skin for at least 1 year after it heals.    Support for leg and arm wounds: You may need to use crutches if the wound is on your leg. You may need to use a sling if the wound is on your arm. Crutches and slings help protect the injured area, prevent further injury, and heal the area in the right position.     Follow up with your healthcare provider within 2 days or as directed: If you have stitches, ask when to return to have them removed. Write down your questions so you remember to ask them during your visits.     Contact your healthcare provider if:     You have new pain, or it gets worse.       You have trouble moving the injured body area.       Your wound splits open or does not seem to be healing.    Return to the emergency department if:     You have a fever.       You have painful swelling, redness, or warmth around your wound.      Your wound is red and there are red streaks on your skin starting at your wound and moving upward.       Your wound is draining pus.       You have heavy bleeding or bleeding that does not stop after 10 minutes of holding firm, direct pressure over the wound.      You feel like there is an object stuck in your wound.          © Copyright [employer] 2019 All illustrations and images included in CareNotes are the copyrighted property of [employer] or [employer].

## 2021-01-15 NOTE — ED ADULT NURSE NOTE - NSIMPLEMENTINTERV_GEN_ALL_ED
Implemented All Universal Safety Interventions:  [place] to call system. Call bell, personal items and telephone within reach. Instruct patient to call for assistance. Room bathroom lighting operational. Non-slip footwear when patient is off stretcher. Physically safe environment: no spills, clutter or unnecessary equipment. Stretcher in lowest position, wheels locked, appropriate side rails in place.

## 2021-01-15 NOTE — ED PROVIDER NOTE - OBJECTIVE STATEMENT
20 year old M with no pmhx c/o avulsion to L thumb. Pt sts was cutting steak with steak knife when he accidentally cut tip of finger. No other injuries. Pt not utd with tetanus. No decreased sensation, decreased rom, paresthesias.

## 2021-01-17 ENCOUNTER — EMERGENCY (EMERGENCY)
Facility: HOSPITAL | Age: 21
LOS: 0 days | Discharge: HOME | End: 2021-01-17
Attending: EMERGENCY MEDICINE | Admitting: EMERGENCY MEDICINE
Payer: COMMERCIAL

## 2021-01-17 VITALS
RESPIRATION RATE: 18 BRPM | HEIGHT: 68 IN | HEART RATE: 98 BPM | DIASTOLIC BLOOD PRESSURE: 79 MMHG | SYSTOLIC BLOOD PRESSURE: 145 MMHG | WEIGHT: 235.01 LBS | OXYGEN SATURATION: 100 % | TEMPERATURE: 99 F
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
## 2021-01-17 DIAGNOSIS — Z90.49 ACQUIRED ABSENCE OF OTHER SPECIFIED PARTS OF DIGESTIVE TRACT: Chronic | ICD-10-CM

## 2021-01-17 DIAGNOSIS — S61.012D LACERATION WITHOUT FOREIGN BODY OF LEFT THUMB WITHOUT DAMAGE TO NAIL, SUBSEQUENT ENCOUNTER: ICD-10-CM

## 2021-01-17 PROCEDURE — 99282 EMERGENCY DEPT VISIT SF MDM: CPT

## 2021-01-17 NOTE — ED PROVIDER NOTE - ATTENDING CONTRIBUTION TO CARE
19 yo M no pmh presents with laceration to the left thumb that occurred 2 days ago. Was cutting steak with a new knife and he sliced the tip of his left thumb off. In ED xray done, wound cleaned and gel foam applied to control bleeidng successsfully. tdap given. Patient states that he was soaking the wound to get the gel foam off but was unable to remove it. Otherwise no issues with wound, bleeding controlled.     CONSTITUTIONAL: Well-developed; well-nourished; in no acute distress.   SKIN: warm, dry. avulsion to the left 1st digit through distal tip including part of nail. no bone exposure. neurovascularly intact. Gel foam still applied to the wound.   EXT: Normal ROM.  5/5 strength, no bone or tendon involvement.   LYMPH: No acute cervical adenopathy.  NEURO: Alert, oriented, grossly unremarkable. neurovascularly intact. 2+ pulses.

## 2021-01-17 NOTE — ED PROVIDER NOTE - PROGRESS NOTE DETAILS
Gel foam intact, put new dressing on top and educated patient to keep it on for about a week or until is falls off

## 2021-01-17 NOTE — ED PROVIDER NOTE - PATIENT PORTAL LINK FT
You can access the FollowMyHealth Patient Portal offered by [facility] by registering at the following website: [email or web address]. By joining [employer]’s FollowMyHealth portal, you will also be able to view your health information using other applications (apps) compatible with our system.

## 2021-01-17 NOTE — ED ADULT TRIAGE NOTE - CHIEF COMPLAINT QUOTE
left thumb lac two days ago. had a dressing placed that he was told to soak off and states it wont come off.. some bleeding noted underneath

## 2021-01-17 NOTE — ED PROVIDER NOTE - NS ED ROS FT
Review of Systems:  	•	CONSTITUTIONAL - no fever, no diaphoresis, no chills  	•	SKIN - no rash  	•	HEMATOLOGIC - no bleeding, no bruising  	•	MUSCULOSKELETAL - no joint paint, no swelling, no redness  	•	NEUROLOGIC - no weakness, no paresthesias

## 2021-01-17 NOTE — ED PROVIDER NOTE - OBJECTIVE STATEMENT
20 year old male with no pmhx presents for wound check. Pt was seen in ED 2 days ago for avulsion of skin to left thumb. Pt had gel foam placed and tried soaking it off today, and it wouldn't come off - so came to ED. no bleeding or pain.

## 2021-01-17 NOTE — ED PROVIDER NOTE - CLINICAL SUMMARY MEDICAL DECISION MAKING FREE TEXT BOX
Patient presents for a wound check. Left thumb laceration 2 days ago with gel foam still applied. Heeling well, no active bleeding. Wound re-dressed. Patient educated on wound care. Return precautions discussed.

## 2021-01-17 NOTE — ED PROVIDER NOTE - PHYSICAL EXAMINATION
GEN: Patient in no acute distress  MS: Normal ROM. pulses 2 +  SKIN: + gel foam to left distal tip of thumb. no bleeding. Warm, dry, no acute rashes. Good turgor  NEURO: Strength 5/5 with no sensory deficits to left hand

## 2021-01-28 NOTE — ED PROVIDER NOTE - PRINCIPAL DIAGNOSIS
Returned call to mother  As per mother, she feels patient is taking the medication  But he may be still struggling  Follow-up on Tuesday on 02/01/2021  Mother did not have any safety concern  Visit for wound check

## 2024-07-18 ENCOUNTER — EMERGENCY (EMERGENCY)
Facility: HOSPITAL | Age: 24
LOS: 0 days | Discharge: HOME | End: 2024-07-18
Attending: EMERGENCY MEDICINE
Payer: COMMERCIAL

## 2024-07-18 VITALS
DIASTOLIC BLOOD PRESSURE: 96 MMHG | SYSTOLIC BLOOD PRESSURE: 146 MMHG | HEIGHT: 67 IN | OXYGEN SATURATION: 99 % | RESPIRATION RATE: 19 BRPM | TEMPERATURE: 98 F | WEIGHT: 244.93 LBS | HEART RATE: 78 BPM

## 2024-07-18 DIAGNOSIS — Z90.49 ACQUIRED ABSENCE OF OTHER SPECIFIED PARTS OF DIGESTIVE TRACT: Chronic | ICD-10-CM

## 2024-07-18 PROCEDURE — 99283 EMERGENCY DEPT VISIT LOW MDM: CPT

## 2024-07-18 PROCEDURE — 99284 EMERGENCY DEPT VISIT MOD MDM: CPT

## 2024-07-18 RX ORDER — METHOCARBAMOL 500 MG
2 TABLET ORAL
Qty: 18 | Refills: 0
Start: 2024-07-18 | End: 2024-07-20

## 2024-07-18 RX ORDER — LIDOCAINE HCL 28 MG/G
1 GEL TOPICAL ONCE
Refills: 0 | Status: COMPLETED | OUTPATIENT
Start: 2024-07-18 | End: 2024-07-18

## 2024-07-18 RX ORDER — LIDOCAINE HCL 28 MG/G
1 GEL TOPICAL
Qty: 1 | Refills: 0
Start: 2024-07-18 | End: 2024-07-22

## 2024-07-18 RX ORDER — METHOCARBAMOL 500 MG
1500 TABLET ORAL ONCE
Refills: 0 | Status: COMPLETED | OUTPATIENT
Start: 2024-07-18 | End: 2024-07-18

## 2024-07-18 RX ADMIN — Medication 1500 MILLIGRAM(S): at 12:57

## 2024-07-18 RX ADMIN — Medication 800 MILLIGRAM(S): at 13:27

## 2024-07-18 RX ADMIN — Medication 800 MILLIGRAM(S): at 12:57

## 2024-07-18 RX ADMIN — LIDOCAINE HCL 1 PATCH: 28 GEL TOPICAL at 12:56
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
## 2024-07-19 DIAGNOSIS — M54.50 LOW BACK PAIN, UNSPECIFIED: ICD-10-CM
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
## 2024-08-12 NOTE — ED PROVIDER NOTE - PHYSICAL EXAMINATION
CONSTITUTIONAL: Well-appearing; well-nourished; in no apparent distress.   NECK: Supple; non-tender; no cervical lymphadenopathy.   CARDIOVASCULAR: Normal S1, S2; no murmurs, rubs, or gallops.   RESPIRATORY: Normal chest excursion with respiration; breath sounds clear and equal bilaterally; no wheezes, rhonchi, or rales.  GI/: Normal bowel sounds; non-distended; non-tender; no palpable organomegaly.   MS: No evidence of trauma or deformity. Normal ROM in all four extremities; non-tender to palpation; distal pulses are normal.   SKIN: + avulsion laceration noted to distal L 1st finger. Partial nail avulsion. No bone exposed.   NEURO/PSYCH: A & O x 4; grossly unremarkable. mood and manner are appropriate. Neurovascular intact
Abdomen soft, non-tender, no guarding.

## 2025-03-01 ENCOUNTER — NON-APPOINTMENT (OUTPATIENT)
Age: 25
End: 2025-03-01

## 2025-05-14 ENCOUNTER — EMERGENCY (EMERGENCY)
Facility: HOSPITAL | Age: 25
LOS: 0 days | Discharge: LEFT BEFORE DISCHARGE | End: 2025-05-14
Attending: EMERGENCY MEDICINE
Payer: COMMERCIAL

## 2025-05-14 VITALS
OXYGEN SATURATION: 99 % | HEIGHT: 68 IN | TEMPERATURE: 98 F | HEART RATE: 60 BPM | DIASTOLIC BLOOD PRESSURE: 95 MMHG | WEIGHT: 289.91 LBS | RESPIRATION RATE: 18 BRPM | SYSTOLIC BLOOD PRESSURE: 131 MMHG

## 2025-05-14 DIAGNOSIS — Z53.21 PROCEDURE AND TREATMENT NOT CARRIED OUT DUE TO PATIENT LEAVING PRIOR TO BEING SEEN BY HEALTH CARE PROVIDER: ICD-10-CM

## 2025-05-14 DIAGNOSIS — K08.89 OTHER SPECIFIED DISORDERS OF TEETH AND SUPPORTING STRUCTURES: ICD-10-CM

## 2025-05-14 DIAGNOSIS — Z90.49 ACQUIRED ABSENCE OF OTHER SPECIFIED PARTS OF DIGESTIVE TRACT: Chronic | ICD-10-CM

## 2025-05-14 PROCEDURE — L9991: CPT

## 2025-05-14 RX ORDER — IBUPROFEN 200 MG
1 TABLET ORAL
Qty: 20 | Refills: 0
Start: 2025-05-14 | End: 2025-05-18

## 2025-05-14 RX ORDER — AMOXICILLIN 500 MG/1
1 CAPSULE ORAL
Qty: 21 | Refills: 0
Start: 2025-05-14 | End: 2025-05-20

## 2025-05-14 RX ORDER — KETOROLAC TROMETHAMINE 30 MG/ML
60 INJECTION, SOLUTION INTRAMUSCULAR; INTRAVENOUS ONCE
Refills: 0 | Status: DISCONTINUED | OUTPATIENT
Start: 2025-05-14 | End: 2025-05-14

## 2025-05-14 RX ADMIN — KETOROLAC TROMETHAMINE 60 MILLIGRAM(S): 30 INJECTION, SOLUTION INTRAMUSCULAR; INTRAVENOUS at 16:10

## 2025-05-14 NOTE — ED ADULT NURSE NOTE - NSFALLUNIVINTERV_ED_ALL_ED
Assistance with ambulation/Monitor gait and stability/Bed/Stretcher in lowest position, wheels locked, appropriate side rails in place/Call bell, personal items and telephone in reach/Instruct patient to call for assistance before getting out of bed/chair/stretcher/Non-slip footwear applied when patient is off stretcher/[place] to call system/Physically safe environment - no spills, clutter or unnecessary equipment/Purposeful proactive rounding/Room/bathroom lighting operational, light cord in reach

## 2025-05-14 NOTE — ED ADULT TRIAGE NOTE - CHIEF COMPLAINT QUOTE
Pt. c/o upper and lower L sided dental pain. Pt. states he has a hole in his lower L side tooth. Pt. states he has an appt. for a root canal on Monday.

## 2025-06-25 NOTE — DISCHARGE NOTE PROVIDER - NSDCACTIVITY_GEN_ALL_CORE
Copied from CRM #[id number]. Topic: Medications - Pharmacy  >> Jun 25, 2025  3:58 PM Jaimie wrote:  .Type:  Needs Medical Advice    Who Called:  [facility] ([email or web address])  Additional Information: Pt pharmacy is calling to see if you have received the forms for an audit that was sent over on last week please call back at [phone] (Henrry)  
Spoke to pharmacist and he is going to fax over some papers for  Dolores to sign from [employer] audit at [facility], will give them to her to look and fax back.   
No restrictions